# Patient Record
Sex: FEMALE | Race: OTHER | NOT HISPANIC OR LATINO | URBAN - METROPOLITAN AREA
[De-identification: names, ages, dates, MRNs, and addresses within clinical notes are randomized per-mention and may not be internally consistent; named-entity substitution may affect disease eponyms.]

---
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
## 2022-04-02 ASSESSMENT — VISUAL ACUITY
OD_CC: 20/400
OS_CC: J1+14"
OS_SC: 20/20
OS_CC: 20/400
OD_SC: 20/25
OS_SC: 20/30
OD_CC: J1+14"
OD_SC: 20/20

## 2022-04-02 ASSESSMENT — TONOMETRY
OS_IOP_MMHG: 15
OD_IOP_MMHG: 14
OS_IOP_MMHG: 15
OD_IOP_MMHG: 15